# Patient Record
Sex: MALE | Race: WHITE | HISPANIC OR LATINO | URBAN - METROPOLITAN AREA
[De-identification: names, ages, dates, MRNs, and addresses within clinical notes are randomized per-mention and may not be internally consistent; named-entity substitution may affect disease eponyms.]

---

## 2019-05-06 ENCOUNTER — APPOINTMENT (RX ONLY)
Dept: URBAN - METROPOLITAN AREA CLINIC 90 | Facility: CLINIC | Age: 29
Setting detail: DERMATOLOGY
End: 2019-05-06

## 2019-05-06 DIAGNOSIS — Z71.89 OTHER SPECIFIED COUNSELING: ICD-10-CM

## 2019-05-06 DIAGNOSIS — D22 MELANOCYTIC NEVI: ICD-10-CM

## 2019-05-06 DIAGNOSIS — Z80.8 FAMILY HISTORY OF MALIGNANT NEOPLASM OF OTHER ORGANS OR SYSTEMS: ICD-10-CM

## 2019-05-06 DIAGNOSIS — L73.8 OTHER SPECIFIED FOLLICULAR DISORDERS: ICD-10-CM

## 2019-05-06 PROBLEM — D22.5 MELANOCYTIC NEVI OF TRUNK: Status: ACTIVE | Noted: 2019-05-06

## 2019-05-06 PROBLEM — D48.5 NEOPLASM OF UNCERTAIN BEHAVIOR OF SKIN: Status: ACTIVE | Noted: 2019-05-06

## 2019-05-06 PROCEDURE — ? INVENTORY

## 2019-05-06 PROCEDURE — ? SUNSCREEN RECOMMENDATIONS

## 2019-05-06 PROCEDURE — 69100 BIOPSY OF EXTERNAL EAR: CPT

## 2019-05-06 PROCEDURE — ? COUNSELING

## 2019-05-06 PROCEDURE — 99203 OFFICE O/P NEW LOW 30 MIN: CPT | Mod: 25

## 2019-05-06 PROCEDURE — ? BIOPSY BY SHAVE METHOD

## 2019-05-06 PROCEDURE — ? COSMETIC EXTRACTIONS

## 2019-05-06 ASSESSMENT — LOCATION DETAILED DESCRIPTION DERM
LOCATION DETAILED: RIGHT PROXIMAL POSTERIOR THIGH
LOCATION DETAILED: RIGHT SUPERIOR LATERAL UPPER BACK
LOCATION DETAILED: RIGHT SUPERIOR MEDIAL UPPER BACK

## 2019-05-06 ASSESSMENT — LOCATION SIMPLE DESCRIPTION DERM
LOCATION SIMPLE: RIGHT UPPER BACK
LOCATION SIMPLE: RIGHT POSTERIOR THIGH
LOCATION SIMPLE: RIGHT BACK

## 2019-05-06 ASSESSMENT — LOCATION ZONE DERM
LOCATION ZONE: LEG
LOCATION ZONE: TRUNK

## 2019-05-06 NOTE — PROCEDURE: COSMETIC EXTRACTIONS
Consent: The patient's consent was obtained including but not limited to risks of crusting, scabbing, blistering, scarring, darker or lighter pigmentary change, recurrence, incomplete removal and infection.
Post-Care Instructions: I reviewed with the patient in detail post-care instructions. Patient is to wear sunprotection, and avoid picking at any of the treated lesions. Pt may apply Vaseline to crusted or scabbing areas.
Detail Level: Simple
Price (Use Numbers Only, No Special Characters Or $): 0
Render The Number Of Extractions: Yes

## 2019-05-06 NOTE — PROCEDURE: BIOPSY BY SHAVE METHOD
Consent: Written consent was obtained and risks were reviewed including but not limited to scarring, infection, bleeding, scabbing, incomplete removal, nerve damage and allergy to anesthesia.
Detail Level: Detailed
Lab: 6
Curettage Text: The wound bed was treated with curettage after the biopsy was performed.
Hemostasis: Aluminum Chloride
Biopsy Method: Dermablade
Notification Instructions: Patient may RTC in 2 weeks if they would like to receive biopsy results in person.  Otherwise, patient is instructed to call the office if not contacted within 2  - 3 weeks.
Was A Bandage Applied: Yes
Bill For Surgical Tray: no
Silver Nitrate Text: The wound bed was treated with silver nitrate after the biopsy was performed.
Dressing: Band-Aid
Post-Care Instructions: I reviewed with the patient in detail post-care instructions. Patient is to keep the biopsy site dry overnight, and then apply bactroban twice daily until healed. Patient may apply hydrogen peroxide soaks to remove any crusting.
Electrodesiccation And Curettage Text: The wound bed was treated with electrodesiccation and curettage after the biopsy was performed.
Type Of Destruction Used: Curettage
Anesthesia Volume In Cc (Will Not Render If 0): 0.2
Billing Type: Third-Party Bill
Biopsy Type: H and E
X Size Of Lesion In Cm: 0
Depth Of Biopsy: dermis
Electrodesiccation Text: The wound bed was treated with electrodesiccation after the biopsy was performed.
Wound Care: Mupirocin
Size Of Lesion In Cm: 0.7
Cryotherapy Text: The wound bed was treated with cryotherapy after the biopsy was performed.
Anesthesia Type: 2% lidocaine without epinephrine

## 2019-06-10 ENCOUNTER — APPOINTMENT (RX ONLY)
Dept: URBAN - METROPOLITAN AREA CLINIC 90 | Facility: CLINIC | Age: 29
Setting detail: DERMATOLOGY
End: 2019-06-10

## 2019-06-10 DIAGNOSIS — L57.0 ACTINIC KERATOSIS: ICD-10-CM

## 2019-06-10 DIAGNOSIS — L81.4 OTHER MELANIN HYPERPIGMENTATION: ICD-10-CM

## 2019-06-10 PROCEDURE — ? LIQUID NITROGEN

## 2019-06-10 PROCEDURE — ? COUNSELING

## 2019-06-10 PROCEDURE — 17000 DESTRUCT PREMALG LESION: CPT

## 2019-06-10 PROCEDURE — 99213 OFFICE O/P EST LOW 20 MIN: CPT | Mod: 25

## 2019-06-10 ASSESSMENT — LOCATION SIMPLE DESCRIPTION DERM
LOCATION SIMPLE: LEFT EAR
LOCATION SIMPLE: LEFT EAR

## 2019-06-10 ASSESSMENT — LOCATION ZONE DERM
LOCATION ZONE: EAR
LOCATION ZONE: EAR

## 2019-06-10 ASSESSMENT — LOCATION DETAILED DESCRIPTION DERM
LOCATION DETAILED: LEFT INFERIOR HELIX
LOCATION DETAILED: LEFT SUPERIOR HELIX

## 2019-06-10 NOTE — PROCEDURE: LIQUID NITROGEN
Render Post-Care Instructions In Note?: no
Number Of Freeze-Thaw Cycles: 2 freeze-thaw cycles
Post-Care Instructions: I reviewed with the patient in detail post-care instructions. Patient is to wear sunprotection, and avoid picking at any of the treated lesions. Pt may apply Vaseline to crusted or scabbing areas.
Duration Of Freeze Thaw-Cycle (Seconds): 3
Aperture Size (Optional): C
Consent: The patient's consent was obtained including but not limited to risks of crusting, scabbing, blistering, scarring, darker or lighter pigmentary change, recurrence, incomplete removal and infection. Also see attached signed consent.
Detail Level: Detailed

## 2022-07-18 ENCOUNTER — APPOINTMENT (RX ONLY)
Dept: URBAN - METROPOLITAN AREA CLINIC 133 | Facility: CLINIC | Age: 32
Setting detail: DERMATOLOGY
End: 2022-07-18

## 2022-07-18 PROBLEM — D48.5 NEOPLASM OF UNCERTAIN BEHAVIOR OF SKIN: Status: ACTIVE | Noted: 2022-07-18

## 2022-07-18 PROCEDURE — ? ADDITIONAL NOTES

## 2022-07-18 PROCEDURE — ? BIOPSY BY SHAVE METHOD

## 2022-07-18 PROCEDURE — 69100 BIOPSY OF EXTERNAL EAR: CPT

## 2022-07-18 NOTE — HPI: BUMPS
How Severe Are Your Bumps?: mild
Have Your Bumps Been Treated?: been treated
Is This A New Presentation, Or A Follow-Up?: Bump
When Was It Treated?: 2019

## 2022-07-18 NOTE — PROCEDURE: MIPS QUALITY
Quality 226: Preventive Care And Screening: Tobacco Use: Screening And Cessation Intervention: Patient screened for tobacco use, is a smoker AND received Cessation Counseling within the Previous 12 Months
Detail Level: Detailed
Quality 110: Preventive Care And Screening: Influenza Immunization: Influenza Immunization not Administered for Documented Reasons.

## 2022-08-03 ENCOUNTER — APPOINTMENT (RX ONLY)
Dept: URBAN - METROPOLITAN AREA CLINIC 133 | Facility: CLINIC | Age: 32
Setting detail: DERMATOLOGY
End: 2022-08-03

## 2022-08-03 DIAGNOSIS — D485 NEOPLASM OF UNCERTAIN BEHAVIOR OF SKIN: ICD-10-CM

## 2022-08-03 DIAGNOSIS — H61.03 CHONDRITIS OF EXTERNAL EAR: ICD-10-CM | Status: STABLE

## 2022-08-03 PROBLEM — H61.032 CHONDRITIS OF LEFT EXTERNAL EAR: Status: ACTIVE | Noted: 2022-08-03

## 2022-08-03 PROBLEM — D48.5 NEOPLASM OF UNCERTAIN BEHAVIOR OF SKIN: Status: ACTIVE | Noted: 2022-08-03

## 2022-08-03 PROCEDURE — ? ADDITIONAL NOTES

## 2022-08-03 PROCEDURE — ? COUNSELING

## 2022-08-03 PROCEDURE — ? PATHOLOGY DISCUSSION

## 2022-08-03 PROCEDURE — ? BIOPSY BY SHAVE METHOD

## 2022-08-03 PROCEDURE — 11102 TANGNTL BX SKIN SINGLE LES: CPT

## 2022-08-03 PROCEDURE — 99212 OFFICE O/P EST SF 10 MIN: CPT | Mod: 25

## 2022-08-03 ASSESSMENT — LOCATION DETAILED DESCRIPTION DERM
LOCATION DETAILED: RIGHT LATERAL SHOULDER
LOCATION DETAILED: LEFT INFERIOR HELIX

## 2022-08-03 ASSESSMENT — LOCATION SIMPLE DESCRIPTION DERM
LOCATION SIMPLE: LEFT EAR
LOCATION SIMPLE: RIGHT SHOULDER

## 2022-08-03 ASSESSMENT — LOCATION ZONE DERM
LOCATION ZONE: ARM
LOCATION ZONE: EAR

## 2022-08-03 NOTE — HPI: BUMPS
How Severe Are Your Bumps?: mild
Have Your Bumps Been Treated?: not been treated
Is This A New Presentation, Or A Follow-Up?: Bump
Additional History: Patient reports that bump used to be an ingrown hair.